# Patient Record
Sex: FEMALE | Race: WHITE | NOT HISPANIC OR LATINO | Employment: FULL TIME | URBAN - METROPOLITAN AREA
[De-identification: names, ages, dates, MRNs, and addresses within clinical notes are randomized per-mention and may not be internally consistent; named-entity substitution may affect disease eponyms.]

---

## 2020-02-17 ENCOUNTER — OFFICE VISIT (OUTPATIENT)
Dept: CARDIOLOGY | Facility: CLINIC | Age: 55
End: 2020-02-17

## 2020-02-17 VITALS
BODY MASS INDEX: 26.68 KG/M2 | HEART RATE: 74 BPM | DIASTOLIC BLOOD PRESSURE: 92 MMHG | SYSTOLIC BLOOD PRESSURE: 140 MMHG | WEIGHT: 170 LBS | HEIGHT: 67 IN

## 2020-02-17 DIAGNOSIS — R00.2 PALPITATIONS: Primary | ICD-10-CM

## 2020-02-17 DIAGNOSIS — R07.2 PRECORDIAL PAIN: ICD-10-CM

## 2020-02-17 PROCEDURE — 93000 ELECTROCARDIOGRAM COMPLETE: CPT | Performed by: INTERNAL MEDICINE

## 2020-02-17 PROCEDURE — 99204 OFFICE O/P NEW MOD 45 MIN: CPT | Performed by: INTERNAL MEDICINE

## 2020-02-17 RX ORDER — HYDROCHLOROTHIAZIDE 25 MG/1
25 TABLET ORAL DAILY
COMMUNITY
Start: 2020-01-06 | End: 2021-02-04

## 2020-02-17 RX ORDER — LOSARTAN POTASSIUM 100 MG/1
100 TABLET ORAL DAILY
COMMUNITY
End: 2021-02-04

## 2020-02-17 NOTE — PROGRESS NOTES
Subjective:     Encounter Date:02/17/2020      Patient ID: Bette Rahman is a 54 y.o. female.    Chief Complaint: Palpitations and chest pain    HPI:   This is a 54-year-old woman who presents for evaluation of palpitations and chest pain.  She is had palpitations for about 20 years.  She thinks it worsened over the last 6 months.  They wake her from sleep.  It feels like a pounding and tachycardia in the chest.  There are no provoking or alleviating factors.  There are no associated symptoms.  They occur about once a week or once in every 2 weeks.  She also is also describes new onset chest pain.  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  When she is walking lifting boxes she has pain in the chest associate with pressure, tightness, diaphoresis, flushing in the face, dyspnea on exertion.  Improves with rest, however she does says she has some of the same some symptoms with emotional stress.  In 2012 she was evaluated at an outside cardiologist with a stress echo which was apparently considered inconclusive, followed by a nuclear stress test.  This was normal.  She states she wore a monitor at that time but they were unable to find any arrhythmias.  She was evaluated again in 2017 by Dr. Marco Quintana, and a beta-blocker was discussed though it is unclear to me whether she actually ever took it.  Her father is a history of aortic valve disease and aortic dilation status post repair.  She is a former smoker who quit in the 80s.    The following portions of the patient's history were reviewed and updated as appropriate: allergies, current medications, past family history, past medical history, past social history, past surgical history and problem list.     REVIEW OF SYSTEMS:   All systems reviewed.  Pertinent positives identified in HPI.  All other systems are negative.    Past Medical History:   Diagnosis Date   • Benign essential hypertension    •  Other chest pain    • Palpitations    • Systolic murmur    • Thyroid disease     thyroid nodules       Family History   Problem Relation Age of Onset   • Aneurysm Mother    • COPD Mother    • Emphysema Mother    • Other Mother         ovarian torsion   • Heart disease Father    • Aneurysm Father    • Mitral valve prolapse Father    • Other Father         aortic deformity   • Diabetes Maternal Grandmother    • Diabetes Maternal Grandfather    • Leukemia Maternal Grandfather    • Diabetes Paternal Grandmother    • Lung cancer Paternal Grandmother    • Diabetes Paternal Grandfather    • Pancreatic cancer Paternal Grandfather    • Heart disease Paternal Grandfather    • Breast cancer Maternal Aunt    • Leukemia Paternal Aunt    • Breast cancer Paternal Aunt    • Heart disease Paternal Aunt    • Heart disease Paternal Uncle    • Other Daughter         chronic nephrosis left kidney, hidradenitis suppurativa       Social History     Socioeconomic History   • Marital status:      Spouse name: Not on file   • Number of children: Not on file   • Years of education: Not on file   • Highest education level: Not on file   Tobacco Use   • Smoking status: Former Smoker     Last attempt to quit:      Years since quittin.1   • Smokeless tobacco: Never Used   • Tobacco comment: smoked 14 years 1ppd   Substance and Sexual Activity   • Alcohol use: Yes     Comment: rarely   • Drug use: Never   • Sexual activity: Yes     Partners: Male     Birth control/protection: Surgical       Allergies   Allergen Reactions   • Erythromycin Arrhythmia     RAPID HEART RATE   • Morphine Hives   • Penicillins Hives and Itching   • Lisinopril Other (See Comments)     COUGH       Past Surgical History:   Procedure Laterality Date   • CHOLECYSTECTOMY     • ENDOMETRIAL ABLATION     • HYSTERECTOMY     • TUBAL ABDOMINAL LIGATION      tubal occlusion         ECG 12 Lead  Date/Time: 2020 4:37 PM  Performed by: Radha Millan  MD  Authorized by: Radha Millan MD   Comparison: compared with previous ECG from 2/11/2020  Similar to previous ECG  Rhythm: sinus rhythm  Rate: normal  Conduction: conduction normal  ST Segments: ST segments normal  T Waves: T waves normal  QRS axis: normal  Other: no other findings    Clinical impression: normal ECG               Objective:         PHYSICAL EXAM:  GEN: VSS, no distress,   Eyes: normal sclera, normal lids and lashes  HENT: moist mucus membranes,   Respiratory: CTAB, no rales or wheezes  CV: RRR, no murmurs, , +2 DP and 2+ carotid pulses b/l  GI: NABS, soft,  Nontender, nondistended  MSK: no edema, no scoliosis or kyphosis  Skin: no rash, warm, dry  Heme/Lymph: no bruising or bleeding  Psych: organized thought, normal behavior and affect  Neuro: Cranial nerves grossly intact, Alert and Oriented x 3.         Assessment:          Diagnosis Plan   1. Palpitations  Holter Monitor - 72 Hour Up To 21 Days   2. Precordial pain  Adult Stress Echo W/ Cont or Stress Agent if Necessary Per Protocol          Plan:       1.  Palpitations: We will get a two-week CO patch to evaluate the symptoms.  They are longstanding over the last 20 years so I doubt that there malignant.  2.  Precordial pain: Typical and atypical features.  She had a normal stress test 8 years ago.  Will repeat stress echocardiogram given the new symptoms over the last few months.    Dr. Sidhu, thank you very much for referring this kind patient to me. Please call me with any questions or concerns. I will see the patient again in the office in 3 months.        Radha Millan MD  02/17/20  [facility]    Outpatient Encounter Medications as of 2/17/2020   Medication Sig Dispense Refill   • hydroCHLOROthiazide (HYDRODIURIL) 25 MG tablet Take 25 mg by mouth Daily.     • losartan (COZAAR) 50 MG tablet Take 50 mg by mouth Daily.       No facility-administered encounter medications on file as of 2/17/2020.       none

## 2020-02-20 DIAGNOSIS — R07.89 CHEST PAIN, ATYPICAL: Primary | ICD-10-CM

## 2020-03-02 ENCOUNTER — HOSPITAL ENCOUNTER (OUTPATIENT)
Dept: CARDIOLOGY | Facility: HOSPITAL | Age: 55
Discharge: HOME | End: 2020-03-02
Admitting: INTERNAL MEDICINE

## 2020-03-02 DIAGNOSIS — R07.89 CHEST PAIN, ATYPICAL: ICD-10-CM

## 2020-03-02 LAB
BH CV STRESS BP STAGE 1: NORMAL
BH CV STRESS BP STAGE 2: NORMAL
BH CV STRESS BP STAGE 3: NORMAL
BH CV STRESS DURATION MIN STAGE 1: 3
BH CV STRESS DURATION MIN STAGE 2: 3
BH CV STRESS DURATION MIN STAGE 3: 3
BH CV STRESS DURATION MIN STAGE 4: 1
BH CV STRESS DURATION SEC STAGE 1: 0
BH CV STRESS DURATION SEC STAGE 2: 0
BH CV STRESS DURATION SEC STAGE 3: 0
BH CV STRESS DURATION SEC STAGE 4: 0
BH CV STRESS GRADE STAGE 1: 10
BH CV STRESS GRADE STAGE 2: 12
BH CV STRESS GRADE STAGE 3: 14
BH CV STRESS GRADE STAGE 4: 16
BH CV STRESS HR STAGE 1: 104
BH CV STRESS HR STAGE 2: 128
BH CV STRESS HR STAGE 3: 148
BH CV STRESS HR STAGE 4: 171
BH CV STRESS METS STAGE 1: 5
BH CV STRESS METS STAGE 2: 7.5
BH CV STRESS METS STAGE 3: 10
BH CV STRESS METS STAGE 4: 13.5
BH CV STRESS PROTOCOL 1: NORMAL
BH CV STRESS RECOVERY BP: NORMAL MMHG
BH CV STRESS RECOVERY HR: 98 BPM
BH CV STRESS SPEED STAGE 1: 1.7
BH CV STRESS SPEED STAGE 2: 2.5
BH CV STRESS SPEED STAGE 3: 3.4
BH CV STRESS SPEED STAGE 4: 4.2
BH CV STRESS STAGE 1: 1
BH CV STRESS STAGE 2: 2
BH CV STRESS STAGE 3: 3
BH CV STRESS STAGE 4: 4
MAXIMAL PREDICTED HEART RATE: 168 BPM
PERCENT MAX PREDICTED HR: 101.79 %
STRESS BASELINE BP: NORMAL MMHG
STRESS BASELINE HR: 82 BPM
STRESS PERCENT HR: 120 %
STRESS POST ESTIMATED WORKLOAD: 11 METS
STRESS POST EXERCISE DUR MIN: 10 MIN
STRESS POST EXERCISE DUR SEC: 0 SEC
STRESS POST PEAK BP: NORMAL MMHG
STRESS POST PEAK HR: 171 BPM
STRESS TARGET HR: 143 BPM

## 2020-03-02 PROCEDURE — 93016 CV STRESS TEST SUPVJ ONLY: CPT | Performed by: INTERNAL MEDICINE

## 2020-03-02 PROCEDURE — 93018 CV STRESS TEST I&R ONLY: CPT | Performed by: INTERNAL MEDICINE

## 2020-03-02 PROCEDURE — 93017 CV STRESS TEST TRACING ONLY: CPT

## 2020-03-04 ENCOUNTER — TELEPHONE (OUTPATIENT)
Dept: CARDIOLOGY | Facility: CLINIC | Age: 55
End: 2020-03-04

## 2020-03-04 RX ORDER — ATENOLOL 25 MG/1
25 TABLET ORAL DAILY
Qty: 30 TABLET | Refills: 11 | Status: SHIPPED | OUTPATIENT
Start: 2020-03-04 | End: 2021-02-04 | Stop reason: SDUPTHER

## 2020-03-04 NOTE — TELEPHONE ENCOUNTER
Pt called and left message     Called pt back and had to leave a message to call office        spoke with pt and sent in an rx for pt

## 2020-03-04 NOTE — PROGRESS NOTES
I called patient and spoke to her briefly. Had to disconnect briefly and call her back. Left her a VM saying I would start atenolol 25mg daily for WPW/ palpitations/ SVT. No cell on file, will try calling again later in the afternoon.

## 2020-03-05 PROBLEM — R94.39 ABNORMAL STRESS ELECTROCARDIOGRAM TEST USING TREADMILL: Status: ACTIVE | Noted: 2020-03-05

## 2020-03-05 PROBLEM — I10 BENIGN ESSENTIAL HTN: Status: ACTIVE | Noted: 2020-03-05

## 2020-03-05 PROBLEM — I45.6 WPW (WOLFF-PARKINSON-WHITE SYNDROME): Status: ACTIVE | Noted: 2020-03-05

## 2020-03-05 PROBLEM — R01.1 SYSTOLIC MURMUR: Status: ACTIVE | Noted: 2020-03-05

## 2020-03-05 NOTE — TELEPHONE ENCOUNTER
I called and spoke with the patient.  I informed her of Dr. Millan's message that her testing (results of which I am unable to see)  shows that she has WPW.  Dr. Millan would like to start her on atenolol 25 mg daily.  I did review potential side effects with the patient and advised her to take food.  The medication was already called to her pharmacy per Dr. Millan  Patient is a physically active job and her symptoms are somewhat limiting for her at work.  There is no option for light duty.  I am going to keep her off work this next week and just see how she does on the atenolol and she can call back 3/16/2020 and see how she is doing with work.  If she does not tolerate it she will call me and let me know.  She will take this in addition to her losartan hydrochlorothiazide.  She has been checking her blood pressure at home and getting readings in the 140s/80s and 90s.  I think she will tolerate all 3 medications together.  Will get her back in to see me again in 3 to 4 weeks for reassessment.    Trip-  Can you please get her in with me in 3-4 weeks instead of May?    Iram- I left a work note for her at your desk. Can you please get that upfront where it needs to go? Thanks.

## 2020-03-06 NOTE — TELEPHONE ENCOUNTER
Spoke to patient. She told me that you told her to come in two weeks instead of 3-4. She is scheduled for 3/24/2020 at 3:00 pm. Thanks.

## 2020-03-09 ENCOUNTER — TELEPHONE (OUTPATIENT)
Dept: CARDIOLOGY | Facility: CLINIC | Age: 55
End: 2020-03-09

## 2020-03-09 NOTE — TELEPHONE ENCOUNTER
Called pt to  [facility] paperwork, she stated that the paperwork we has passed her employers deadline. She stated they are sending her another set and she will drop off paperwork when she gets it.

## 2020-03-20 ENCOUNTER — TELEPHONE (OUTPATIENT)
Dept: CARDIOLOGY | Facility: CLINIC | Age: 55
End: 2020-03-20

## 2020-03-20 NOTE — TELEPHONE ENCOUNTER
This patient is going to be moved from 3/24/2020 to my schedule for a telephone visit 9:30 AM Monday, 3/23/2020.  Can you all please arrange this and move her on my schedule?

## 2020-03-23 ENCOUNTER — TELEPHONE (OUTPATIENT)
Dept: CARDIOLOGY | Facility: CLINIC | Age: 55
End: 2020-03-23

## 2020-03-23 ENCOUNTER — OFFICE VISIT (OUTPATIENT)
Dept: CARDIOLOGY | Facility: CLINIC | Age: 55
End: 2020-03-23

## 2020-03-23 VITALS — WEIGHT: 175 LBS | HEIGHT: 66 IN | BODY MASS INDEX: 28.12 KG/M2

## 2020-03-23 DIAGNOSIS — I45.6 WPW (WOLFF-PARKINSON-WHITE SYNDROME): Primary | ICD-10-CM

## 2020-03-23 DIAGNOSIS — I10 BENIGN ESSENTIAL HTN: ICD-10-CM

## 2020-03-23 DIAGNOSIS — R07.9 CHEST PAIN, UNSPECIFIED TYPE: ICD-10-CM

## 2020-03-23 DIAGNOSIS — R00.2 PALPITATIONS: ICD-10-CM

## 2020-03-23 PROCEDURE — 99213 OFFICE O/P EST LOW 20 MIN: CPT | Performed by: NURSE PRACTITIONER

## 2020-03-23 RX ORDER — CETIRIZINE HYDROCHLORIDE 10 MG/1
10 TABLET ORAL AS NEEDED
COMMUNITY

## 2020-03-23 NOTE — TELEPHONE ENCOUNTER
----- Message from Jackson Martinez MD sent at 3/23/2020  9:52 AM EDT -----  Just so that there is a record, I will type this message.  She has a septal pathway on her ECG.  However on the long-term ECG patch monitor there are clear-cut periods of intermittent preexcitation.  Given her age of 54 and the intermittent preexcita  tion this excludes a life-threatening WPW situation.  That is because an intermittently conducting pathway will not conduct atrial fibrillation rapidly.  In addition and another benign feature is that the 13-day monitor showed no episodes of SVT.  Tonio yun did have premature atrial beats and short runs of atrial tachycardia.  I think the best treatment is reassurance and lots of reassurance and beta-blocker and then if that does not work when the virus situation passes I can see her in the office and   discuss other options.  We would definitely want to have some documentation of SVT before proceeding with a SVT ablation

## 2020-03-23 NOTE — PROGRESS NOTES
Date of Office Visit: 2020  Encounter Provider: IRMA Young  Primary Cardiologist: Dr. Millan  Place of Service: [facility]  Patient Name: Bette Rahman  :1965      Subjective:     Chief Complaint:  Telephone follow-up Leatha-Parkinson-White, chest pain/palpitations, hypertension    History of Present Illness:  Bette Rahman is a pleasant 54 y.o. female who is newto me .  Outside records have been obtained and reviewed by me.     She has a history of fibromyalgia as well as hypertension, palpitations, chest pain, questionable history of rheumatic fever as a child, systolic murmur, thyroid nodules multiple gynecologic surgeries.  Now with newly diagnosed Leatha-Parkinson-White syndrome.  Family history Father with aortic valve disease and aortic dilation status post repair.  Family history of pancreatic cancer.     she was apparently evaluated by another cardiologist and had a stress echo which was considered inconclusive followed by a nuclear stress test that was reportedly normal.  Her EF was reportedly normal and there is no significant valvular disease noted at that time.  She wore a monitor at that time without any significant arrhythmias detected.     she saw Dr. Marco Quintana and beta-blocker was discussed though was unclear if she actually ever took it as well as additional testing recommended for chest pain and palpitations though this does not appear to be performed.    2020 patient presented to see Dr. Millan for evaluation of palpitations and chest pain. At this initial appointment with Dr. Millan she reported episodes of tachycardia and pounding sensation which apparently had been ongoing for about 20 years without reported provoking or alleviating factors.  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  Reportedly this would cause some pain in her chest,  diaphoresis, flushing and dyspnea on exertion that apparently improved with rest.  Dr. Millan wanted to set her up for stress echo but it appears she had a treadmill stress test that showed preexcitation and Ziopatch with evidence of accessory pathway/preexcitation consistent with Leatha-Parkinson-White syndrome.  She was initiated on atenolol.       This patient has consented to a telehealth visit via telephone. I spent  20  minutes in direct conversation with this patient.  I did review her stress test and her ZIO monitor with Dr. Martinez.  He feels her accessory pathway is not dangerous and recommends up titration of beta-blocker for symptoms as it allows.  He had no concerns of ischemia on her stress test.  She tells me that she was prompted to go see Dr. Millan for an abnormal EKG by her PCP which she tells me was checked during a routine visit.  She stated that they told her there was concern for heart attack on that EKG-though I cannot see the actual EKG tracing from that time but per Dr. Sidhu's note it looks like she possibly had inferior Q waves that were new.  She tells me that Dr. Millan told her that she did not think their office EKG was accurate as her EKG was normal in our office.  Since starting the atenolol she has not noticed a big difference in any of her symptoms cleaning her intermittent palpitations.  She described intermittent pinching chest pain that is mild and last 30 seconds and can be separate from palpitation.  She also has intermittent night sweats and flushing episodes that she believes could possibly be hormone related as well as cold hands and feet with some numbness and tingling that reportedly is new she was advised to follow-up with her PCP regarding that.  She also has noticed some lightheadedness when she takes both the beta-blocker and Zyrtec.  She takes her hydrochlorothiazide and losartan around 5 PM at night and then her atenolol and Zyrtec at 6 PM.  Typically she will notice  the lightheadedness more in the morning.  She reports that she can tell she is due for her medication again in the afternoon and her blood pressure gets up as high as 140s/90s in the afternoon.  She typically gets heart rate readings in the 70s.  She tells me she is working 10-hour shifts multiple days in a row and this is very taxing on her.  No reported syncope or significant shortness of breath.  Occasionally she will have some mild shortness of breath but this does not persist.      Most recent lab review: 2/5/2020 CMP normal, CBC normal, lipid panel showed mild hyperlipidemia with LDL of 115 and total cholesterol 200, HDL was 68, TSH normal 0.718      Past Medical History:   Diagnosis Date   • Benign essential hypertension    • Other chest pain    • Palpitations    • Systolic murmur    • Thyroid disease     thyroid nodules     Past Surgical History:   Procedure Laterality Date   • CHOLECYSTECTOMY     • ENDOMETRIAL ABLATION  2011   • HYSTERECTOMY  2011   • TUBAL ABDOMINAL LIGATION  2011    tubal occlusion     Outpatient Medications Prior to Visit   Medication Sig Dispense Refill   • atenolol (TENORMIN) 25 MG tablet Take 1 tablet by mouth Daily. 30 tablet 11   • cetirizine (zyrTEC) 10 MG tablet Take 10 mg by mouth Daily.     • hydroCHLOROthiazide (HYDRODIURIL) 25 MG tablet Take 25 mg by mouth Daily.     • losartan (COZAAR) 50 MG tablet Take 50 mg by mouth Daily.       No facility-administered medications prior to visit.        Allergies as of 03/23/2020 - Reviewed 03/23/2020   Allergen Reaction Noted   • Erythromycin Arrhythmia 10/01/2012   • Morphine Hives 10/01/2012   • Penicillins Hives and Itching 10/01/2012   • Lisinopril Other (See Comments) 03/16/2017     Social History     Socioeconomic History   • Marital status:      Spouse name: Not on file   • Number of children: Not on file   • Years of education: Not on file   • Highest education level: Not on file   Tobacco Use   • Smoking status: Former  Smoker     Last attempt to quit:      Years since quittin.2   • Smokeless tobacco: Never Used   • Tobacco comment: smoked 14 years 1ppd   Substance and Sexual Activity   • Alcohol use: Yes     Comment: rarely/caffiene use 2 drinks a day   • Drug use: Never   • Sexual activity: Yes     Partners: Male     Birth control/protection: Surgical     Family History   Problem Relation Age of Onset   • Aneurysm Mother    • COPD Mother    • Emphysema Mother    • Other Mother         ovarian torsion   • Heart disease Father    • Aneurysm Father    • Mitral valve prolapse Father    • Other Father         aortic deformity   • Diabetes Maternal Grandmother    • Diabetes Maternal Grandfather    • Leukemia Maternal Grandfather    • Diabetes Paternal Grandmother    • Lung cancer Paternal Grandmother    • Diabetes Paternal Grandfather    • Pancreatic cancer Paternal Grandfather    • Heart disease Paternal Grandfather    • Breast cancer Maternal Aunt    • Leukemia Paternal Aunt    • Breast cancer Paternal Aunt    • Heart disease Paternal Aunt    • Heart disease Paternal Uncle    • Other Daughter         chronic nephrosis left kidney, hidradenitis suppurativa     Review of Systems   Constitution: Positive for diaphoresis and night sweats. Negative for chills, fever and malaise/fatigue.   HENT: Negative for ear pain, hearing loss, nosebleeds and sore throat.         Seasonal allergies   Eyes: Negative for double vision, pain, vision loss in left eye and vision loss in right eye.   Cardiovascular: Positive for chest pain and palpitations. Negative for claudication, dyspnea on exertion, irregular heartbeat, leg swelling, near-syncope, orthopnea, paroxysmal nocturnal dyspnea and syncope.   Respiratory: Positive for shortness of breath and snoring. Negative for cough and wheezing.    Endocrine: Positive for cold intolerance. Negative for heat intolerance.   Hematologic/Lymphatic: Negative for bleeding problem.   Skin: Positive for  "dry skin. Negative for color change, itching, rash and unusual hair distribution.   Musculoskeletal: Positive for myalgias. Negative for joint pain and joint swelling.   Gastrointestinal: Negative for abdominal pain, diarrhea, hematochezia, melena, nausea and vomiting.        IBS   Genitourinary: Negative for decreased libido, frequency, hematuria, hesitancy and incomplete emptying.   Neurological: Positive for excessive daytime sleepiness, dizziness, light-headedness, numbness (intermittent fingers/toes) and paresthesias. Negative for headaches, loss of balance and seizures.   Psychiatric/Behavioral: Negative for depression.          Objective:     Vitals:    03/23/20 0929   Weight: 79.4 kg (175 lb)   Height: 167.6 cm (66\")     Body mass index is 28.25 kg/m².    PHYSICAL EXAM:  Physical Exam physical exam deferred this was telephone encounter.    Procedures EKG unable to perform because this is a telephone encounter.    Assessment:       Diagnosis Plan   1. WPW (Leatha-Parkinson-White syndrome)     2. Benign essential HTN     3. Chest pain, unspecified type     4. Palpitations         Plan:     1.  Leatha-Parkinson-White syndrome/palpitations: Reviewed case with Dr. Martinez who viewed her recent ZIO Patch.  He does not feel her situation is dangerous.  We will see if her symptoms improve with continuing on atenolol.  February 2020 lab work with PCP stable including stable thyroid function.  2.  Hypertension: Blood pressure still running elevated in the evening.  She takes all of her medications within an hour of each other in the evening.  I have asked that she try to take her HCTZ and losartan in the morning and possibly her atenolol at nighttime and see if she gets better blood pressure readings.  3.  Chest pain: He had equivocal stress echocardiogram in 2012 with a follow-up nuclear stress test that was reportedly negative for ischemia.  She has had treadmill stress test work-up and this was reviewed with Dr." Juan today which demonstrated preexcitation at rest and improvement with increase in heart rate.  4.  Hyperlipidemia: This is mild with LDL of 115, HDL good 68 February 2020 labs.    Her symptoms are somewhat limiting to her job which is very physically taxing with many long hours.  For now she will be given another work note off for 2 more weeks and we will reevaluate her symptoms and blood pressures.  She will have to determine with her PCP if she needs to be off longer due to her concerns from a quarantine standpoint as she considers herself a high risk patient due to family member's illness.         I have reviewed this case with Dr. Martinez.  I advised the patient to contact our office with any questions or concerns and she will call the office in a week or 2 weeks and let us know if shifting the timing of her medication improves any symptoms and her blood pressure.  If she continues to have symptoms we will consider echocardiogram if necessary.  Per Dr. Martinez will also plan to have her follow up with after [place] in the office in about 3 to 4 months if able from COVID 19 standpoint, unless otherwise needed sooner then she can follow-up with Dr. Millan after that.  . It has been a pleasure to participate in this patient's care. Please feel free to contact me with any questions or concerns.     IRMA Young  03/23/2020             Your medication list           Accurate as of March 23, 2020  7:19 PM. If you have any questions, ask your nurse or doctor.               CONTINUE taking these medications      Instructions Last Dose Given Next Dose Due   atenolol 25 MG tablet  Commonly known as:  TENORMIN      Take 1 tablet by mouth Daily.       cetirizine 10 MG tablet  Commonly known as:  zyrTEC      Take 10 mg by mouth Daily.       hydroCHLOROthiazide 25 MG tablet  Commonly known as:  HYDRODIURIL      Take 25 mg by mouth Daily.       losartan 50 MG tablet  Commonly known as:  COZAAR      Take 50 mg  by mouth Daily.              The above medication changes may not have been made by this provider.  Medication list was updated to reflect medications patient is currently taking including medication changes and discontinuations made by other healthcare providers.     Dictated utilizing Dragon Dictation System.

## 2020-03-23 NOTE — TELEPHONE ENCOUNTER
Schedulers-please get this patient scheduled with Dr. Martinez in the office in 3 to 4 months (per Dr. Martinez).

## 2020-03-23 NOTE — TELEPHONE ENCOUNTER
This message is added to pt's chart...Xin   [Dear  ___] : Dear  [unfilled], [Courtesy Letter:] : I had the pleasure of seeing your patient, [unfilled], in my office today. [Please see my note below.] : Please see my note below. [Consult Closing:] : Thank you very much for allowing me to participate in the care of this patient.  If you have any questions, please do not hesitate to contact me. [Sincerely,] : Sincerely, [FreeTextEntry2] : Carmen Joe MD\par [address]\[place] [address] [FreeTextEntry3] : Crystal Gramajo RN, PNP\par Division of Pediatric Surgery \par [facility] \par [phone]

## 2020-03-31 ENCOUNTER — TELEPHONE (OUTPATIENT)
Dept: CARDIOLOGY | Facility: CLINIC | Age: 55
End: 2020-03-31

## 2020-03-31 NOTE — TELEPHONE ENCOUNTER
I have no physical way of filling that out right now. Iram was working on that a while back and to my knowledge has sent in these [facility] papers numerous times.  Symptoms she reported to me include pinching chest pain and some shortness of breath and fatigue with exertion as well as intermittent palpitations with preexcitation and rapid heartbeat.  She has had difficulty completing/alerting the physical nature and long shifts required at her job. I am not sure if there is a number to call and explain that we are currently out of office due to pandemic.

## 2020-03-31 NOTE — TELEPHONE ENCOUNTER
Pt called and her employer said that on the [facility] paperwork you filled out they need number 4 to be filled out before they will accept.  Please advise and I will forward it to the patient.

## 2020-03-31 NOTE — TELEPHONE ENCOUNTER
I printed out the forms filled out the information with the info below and also provided a copy of this telephone note to be as official as we can be during this time.      Call pt and LMM re: how does she want me to get this to her?  Pickup at EP or mail?  Call to let me know.

## 2020-06-10 ENCOUNTER — OFFICE VISIT (OUTPATIENT)
Dept: CARDIOLOGY | Facility: CLINIC | Age: 55
End: 2020-06-10

## 2020-06-10 VITALS
BODY MASS INDEX: 27.31 KG/M2 | HEART RATE: 61 BPM | WEIGHT: 174 LBS | DIASTOLIC BLOOD PRESSURE: 72 MMHG | SYSTOLIC BLOOD PRESSURE: 114 MMHG | HEIGHT: 67 IN

## 2020-06-10 DIAGNOSIS — R00.2 PALPITATIONS: ICD-10-CM

## 2020-06-10 DIAGNOSIS — I10 BENIGN ESSENTIAL HTN: Primary | ICD-10-CM

## 2020-06-10 DIAGNOSIS — I45.6 WPW (WOLFF-PARKINSON-WHITE SYNDROME): ICD-10-CM

## 2020-06-10 PROCEDURE — 93000 ELECTROCARDIOGRAM COMPLETE: CPT | Performed by: INTERNAL MEDICINE

## 2020-06-10 PROCEDURE — 99204 OFFICE O/P NEW MOD 45 MIN: CPT | Performed by: INTERNAL MEDICINE

## 2020-06-10 RX ORDER — LOSARTAN POTASSIUM AND HYDROCHLOROTHIAZIDE 25; 100 MG/1; MG/1
TABLET ORAL
COMMUNITY
Start: 2020-06-09 | End: 2021-02-04 | Stop reason: SDUPTHER

## 2020-06-10 NOTE — PROGRESS NOTES
Date of Office Visit: 06/10/2020  Encounter Provider: Jackson Martinez MD  Place of Service: [facility] CARDIOLOGY  Patient Name: Bette Rahman  : 1965    Subjective:     Encounter Date:06/10/2020      Patient ID: Bette Rahman is a 54 y.o. female who has a cc of  Palpitations     She has periods of chest pounding and racing. She will feel this at night --- she has sudden onset racing. Reg rate, lasting minutes -she has had this for years. Freq --three times per week.     No diff on bb     When not having arrhythmia -   No anginal chest pain,   No sig payan,   No soa,   No fainting,  No orthostasis.   No edema.   Exercise tolerance: very active. She has lost 20 lbs.    There have been no hospital admission since the last visit.     There have been no bleeding events.       Past Medical History:   Diagnosis Date   • Benign essential hypertension    • Other chest pain    • Palpitations    • Systolic murmur    • Thyroid disease     thyroid nodules       Social History     Socioeconomic History   • Marital status:      Spouse name: Not on file   • Number of children: Not on file   • Years of education: Not on file   • Highest education level: Not on file   Tobacco Use   • Smoking status: Former Smoker     Last attempt to quit:      Years since quittin.4   • Smokeless tobacco: Never Used   • Tobacco comment: smoked 14 years 1ppd   Substance and Sexual Activity   • Alcohol use: Yes     Comment: rarely/caffiene use 2 drinks a day   • Drug use: Never   • Sexual activity: Yes     Partners: Male     Birth control/protection: Surgical       Review of Systems   Constitution: Negative for fever and night sweats.   HENT: Negative for ear pain and stridor.    Eyes: Negative for discharge and visual halos.   Cardiovascular: Negative for cyanosis.   Respiratory: Negative for hemoptysis and sputum production.    Hematologic/Lymphatic: Negative for adenopathy.   Skin: Negative for nail  "changes and unusual hair distribution.   Musculoskeletal: Negative for gout and joint swelling.   Gastrointestinal: Negative for bowel incontinence and flatus.   Genitourinary: Negative for dysuria and flank pain.   Neurological: Negative for seizures and tremors.   Psychiatric/Behavioral: Negative for altered mental status. The patient is not nervous/anxious.             Objective:     Vitals:    06/10/20 1039   BP: 114/72   BP Location: Right arm   Patient Position: Sitting   Cuff Size: Large Adult   Pulse: 61   Weight: 78.9 kg (174 lb)   Height: 168.9 cm (66.5\")         Physical Exam   Constitutional: She is oriented to person, place, and time.   HENT:   Head: Normocephalic and atraumatic.   Eyes: Right eye exhibits no discharge. Left eye exhibits no discharge.   Neck: No JVD present. No thyromegaly present.   Cardiovascular: Normal rate and regular rhythm. Exam reveals no gallop and no friction rub.   No murmur heard.  Pulmonary/Chest: Effort normal and breath sounds normal. She has no rales.   Abdominal: Soft. Bowel sounds are normal. There is no tenderness.   Musculoskeletal: Normal range of motion. She exhibits no edema or deformity.   Neurological: She is alert and oriented to person, place, and time. She exhibits normal muscle tone.   Skin: Skin is warm and dry. No erythema.   Psychiatric: She has a normal mood and affect. Her behavior is normal. Thought content normal.         ECG 12 Lead  Date/Time: 6/10/2020 11:07 AM  Performed by: Jackson Martinez MD  Authorized by: Jackson Martinez MD   Comparison: compared with previous ECG   Similar to previous ECG  Rhythm: sinus rhythm    Clinical impression: abnormal EKG  Comments: intermittant preexcitation             Lab Review:       Assessment:          Diagnosis Plan   1. Benign essential HTN     2. WPW (Leatha-Parkinson-White syndrome)     3. Palpitations            Plan:     She does have WPW but the AP is intermittently conducting which confers a totally " benign course.     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    The pathway is septal and  In the vicinity of the AV node and ablation might be challenging. (Might--hard to know until we do the study)>     I explained this situation to her--focusing on the benign nature of this and what to do is a preference sensitive decision     If she could put up the symptoms she avoids the risk (albeit small) of the procedure.     She will think about it.     I am conservative on this but don't want her to think ablation can't be done.

## 2020-09-25 ENCOUNTER — TELEPHONE (OUTPATIENT)
Dept: CARDIOLOGY | Facility: CLINIC | Age: 55
End: 2020-09-25

## 2020-09-25 NOTE — TELEPHONE ENCOUNTER
Pt is scheduling a hysterectomy office is wanting to know if she is okay to move forward from a cardiac standpoint. Pt has NO history of stroke or valve replacement....Xin

## 2021-01-19 ENCOUNTER — IMMUNIZATION (OUTPATIENT)
Dept: VACCINE CLINIC | Facility: HOSPITAL | Age: 56
End: 2021-01-19

## 2021-01-19 PROCEDURE — 91300 HC SARSCOV02 VAC 30MCG/0.3ML IM: CPT | Performed by: INTERNAL MEDICINE

## 2021-01-19 PROCEDURE — 0001A: CPT | Performed by: INTERNAL MEDICINE

## 2021-02-04 ENCOUNTER — OFFICE VISIT (OUTPATIENT)
Dept: CARDIOLOGY | Facility: CLINIC | Age: 56
End: 2021-02-04

## 2021-02-04 VITALS
HEIGHT: 67 IN | DIASTOLIC BLOOD PRESSURE: 82 MMHG | WEIGHT: 181 LBS | SYSTOLIC BLOOD PRESSURE: 118 MMHG | BODY MASS INDEX: 28.41 KG/M2 | HEART RATE: 67 BPM

## 2021-02-04 DIAGNOSIS — I45.6 WPW (WOLFF-PARKINSON-WHITE SYNDROME): ICD-10-CM

## 2021-02-04 DIAGNOSIS — R00.2 PALPITATIONS: ICD-10-CM

## 2021-02-04 PROCEDURE — 93000 ELECTROCARDIOGRAM COMPLETE: CPT | Performed by: NURSE PRACTITIONER

## 2021-02-04 PROCEDURE — 99213 OFFICE O/P EST LOW 20 MIN: CPT | Performed by: NURSE PRACTITIONER

## 2021-02-04 RX ORDER — ATENOLOL 25 MG/1
25 TABLET ORAL DAILY
Qty: 90 TABLET | Refills: 3 | Status: SHIPPED | OUTPATIENT
Start: 2021-02-04

## 2021-02-04 RX ORDER — LOSARTAN POTASSIUM AND HYDROCHLOROTHIAZIDE 25; 100 MG/1; MG/1
1 TABLET ORAL DAILY
Qty: 90 TABLET | Refills: 3 | Status: SHIPPED | OUTPATIENT
Start: 2021-02-04

## 2021-02-04 NOTE — PROGRESS NOTES
Date of Office Visit: 2021  Encounter Provider: IRMA Leonardo  Place of Service: [facility] CARDIOLOGY  Patient Name: Bette Rahman  :1965    Chief Complaint   Patient presents with   • Hypertension   • Palpitations   :     HPI: Bette Rahman is a 55 y.o. female who was initially seen by Dr. Millan for palpitations---noted to have intermittent preexcitation on EKG---referred to Dr. Martinez.     He saw her in ---she had been started on BB---she did not really think it made a difference at that time---she presents today for follow up.     She says she now knows the BB does make a difference she can tell if she does not take it---increased palpitations.     She still has episodes daily but says they are tolerable---last anywhere between 5 minutes and 2 hours---she predominantly notices the episodes at night.     She has no chest pain, dyspnea, PND, orthopnea, dizziness or edema.     Per his note the pathway is septal and in the vicinity of the AV node and ablation might be challenging---with the AP being intermittently conducting which confers a totally benign course.        Past Medical History:   Diagnosis Date   • Benign essential hypertension    • Other chest pain    • Palpitations    • Systolic murmur    • Thyroid disease     thyroid nodules   • WPW (Leatha-Parkinson-White syndrome)        Past Surgical History:   Procedure Laterality Date   • CHOLECYSTECTOMY     • ENDOMETRIAL ABLATION     • HYSTERECTOMY     • TUBAL ABDOMINAL LIGATION  2011    tubal occlusion       Social History     Socioeconomic History   • Marital status:      Spouse name: Not on file   • Number of children: Not on file   • Years of education: Not on file   • Highest education level: Not on file   Tobacco Use   • Smoking status: Former Smoker     Quit date:      Years since quittin.1   • Smokeless tobacco: Never Used   • Tobacco comment: smoked 14 years 1ppd   Substance  and Sexual Activity   • Alcohol use: Yes     Comment: rarely/caffiene use 2 drinks a day   • Drug use: Never   • Sexual activity: Yes     Partners: Male     Birth control/protection: Surgical       Family History   Problem Relation Age of Onset   • Aneurysm Mother    • COPD Mother    • Emphysema Mother    • Other Mother         ovarian torsion   • Heart disease Father    • Aneurysm Father    • Mitral valve prolapse Father    • Other Father         aortic deformity   • Diabetes Maternal Grandmother    • Diabetes Maternal Grandfather    • Leukemia Maternal Grandfather    • Diabetes Paternal Grandmother    • Lung cancer Paternal Grandmother    • Diabetes Paternal Grandfather    • Pancreatic cancer Paternal Grandfather    • Heart disease Paternal Grandfather    • Breast cancer Maternal Aunt    • Leukemia Paternal Aunt    • Breast cancer Paternal Aunt    • Heart disease Paternal Aunt    • Heart disease Paternal Uncle    • Other Daughter         chronic nephrosis left kidney, hidradenitis suppurativa       Review of Systems   Constitution: Negative for chills, fever and malaise/fatigue.   Cardiovascular: Positive for palpitations. Negative for chest pain, dyspnea on exertion, leg swelling, near-syncope, orthopnea, paroxysmal nocturnal dyspnea and syncope.   Respiratory: Negative for cough and shortness of breath.    Musculoskeletal: Negative for joint pain, joint swelling and myalgias.   Gastrointestinal: Negative for abdominal pain, diarrhea, melena, nausea and vomiting.   Genitourinary: Negative for frequency and hematuria.   Neurological: Negative for light-headedness, numbness, paresthesias and seizures.   Allergic/Immunologic: Negative.    All other systems reviewed and are negative.      Allergies   Allergen Reactions   • Nuts Shortness Of Breath     Throat closes   • Erythromycin Arrhythmia     RAPID HEART RATE   • Morphine Hives   • Penicillins Hives and Itching   • Lisinopril Other (See Comments)     COUGH   •  "Shellfish-Derived Products Rash     Per allergy testing         Current Outpatient Medications:   •  atenolol (TENORMIN) 25 MG tablet, Take 1 tablet by mouth Daily., Disp: 90 tablet, Rfl: 3  •  cetirizine (zyrTEC) 10 MG tablet, Take 10 mg by mouth As Needed., Disp: , Rfl:   •  losartan-hydrochlorothiazide (HYZAAR) 100-25 MG per tablet, Take 1 tablet by mouth Daily., Disp: 90 tablet, Rfl: 3      Objective:     Vitals:    02/04/21 0851   BP: 118/82   Pulse: 67   Weight: 82.1 kg (181 lb)   Height: 168.9 cm (66.5\")     Body mass index is 28.78 kg/m².    PHYSICAL EXAM:    Vitals Reviewed.   General Appearance: No acute distress, well developed and well nourished.   Eyes: Conjunctiva and lids: No erythema, swelling, or discharge. Sclera non-icteric.   HENT: Atraumatic, normocephalic. External eyes, ears, and nose normal.   Respiratory: No signs of respiratory distress. Respiration rhythm and depth normal.   Clear to auscultation. No rales, crackles, rhonchi, or wheezing auscultated.   Cardiovascular:  Jugular Venous Pressure: Normal  Heart Rate and Rhythm: Normal, Heart Sounds: Normal S1 and S2. No S3 or S4 noted.  Murmurs: No murmurs noted. No rubs, thrills, or gallops.   Arterial Pulses:  Posterior tibialis and dorsalis pedis pulses normal.   Lower Extremities: No edema noted.  Gastrointestinal:  Abdomen soft, non-distended, non-tender.   Musculoskeletal: Normal movement of extremities  Skin and Nails: General appearance normal. No pallor, cyanosis, diaphoresis. Skin temperature normal. No clubbing of fingernails.   Psychiatric: Patient alert and oriented to person, place, and time. Speech and behavior appropriate. Normal mood and affect.       ECG 12 Lead    Date/Time: 2/4/2021 10:05 AM  Performed by: Chelly Reyes APRN  Authorized by: Chelly Reyes APRN   Comparison: compared with previous ECG   Similar to previous ECG  Rhythm: sinus rhythm  BPM: 67  Other findings: delta wave              Assessment:       " Diagnosis Plan   1. Palpitations     2. WPW (Leatha-Parkinson-White syndrome)            Plan:       Palpitaitons/WPW---intermittent  Preexcitation on EKG----she initially did not think the BB made a difference but now can tell if she does not take it---with her having episodes predominantly at night we discussed trying to take atenolol 12.5 mg BID and see if that decreased the nighttime frequency----also discussed when and how to take extra dose of atenolol for any prolonged episodes.     Follow up with Dr. Martinez in 6 months.     As always, it has been a pleasure to participate in your patient's care.      Sincerely,         IRMA Kong

## 2021-02-09 ENCOUNTER — IMMUNIZATION (OUTPATIENT)
Dept: VACCINE CLINIC | Facility: HOSPITAL | Age: 56
End: 2021-02-09

## 2021-02-09 PROCEDURE — 0002A: CPT | Performed by: INTERNAL MEDICINE

## 2021-02-09 PROCEDURE — 91300 HC SARSCOV02 VAC 30MCG/0.3ML IM: CPT | Performed by: INTERNAL MEDICINE
